# Patient Record
Sex: MALE | Race: WHITE | NOT HISPANIC OR LATINO | URBAN - METROPOLITAN AREA
[De-identification: names, ages, dates, MRNs, and addresses within clinical notes are randomized per-mention and may not be internally consistent; named-entity substitution may affect disease eponyms.]

---

## 2022-02-04 ENCOUNTER — OFFICE VISIT (OUTPATIENT)
Dept: INTERNAL MEDICINE | Facility: IMAGING CENTER | Age: 47
End: 2022-02-04
Payer: COMMERCIAL

## 2022-02-04 DIAGNOSIS — E29.1 ANDROGEN DEFICIENCY: ICD-10-CM

## 2022-02-04 DIAGNOSIS — K52.9 CHRONIC DIARRHEA: ICD-10-CM

## 2022-02-04 DIAGNOSIS — F40.243 ANXIETY WITH FLYING: ICD-10-CM

## 2022-02-04 DIAGNOSIS — E78.1 HYPERTRIGLYCERIDEMIA: ICD-10-CM

## 2022-02-04 DIAGNOSIS — Z85.828 HISTORY OF SQUAMOUS CELL CARCINOMA OF SKIN: ICD-10-CM

## 2022-02-04 DIAGNOSIS — L21.9 SEBORRHEIC DERMATITIS OF SCALP: ICD-10-CM

## 2022-02-04 DIAGNOSIS — R82.998 URICOSURIA: ICD-10-CM

## 2022-02-04 PROBLEM — I48.0 PAROXYSMAL A-FIB (HCC): Status: ACTIVE | Noted: 2018-03-19

## 2022-02-04 PROBLEM — I48.91 ATRIAL FIBRILLATION (HCC): Status: ACTIVE | Noted: 2018-03-19

## 2022-02-04 PROBLEM — M85.859 OSTEOPENIA OF HIP: Status: ACTIVE | Noted: 2022-02-04

## 2022-02-04 PROCEDURE — 99203 OFFICE O/P NEW LOW 30 MIN: CPT | Performed by: INTERNAL MEDICINE

## 2022-02-04 RX ORDER — ALPRAZOLAM 0.5 MG/1
0.5 TABLET ORAL PRN
COMMUNITY

## 2022-02-04 RX ORDER — CHOLESTYRAMINE 4 G/9G
1 POWDER, FOR SUSPENSION ORAL DAILY
COMMUNITY
Start: 2022-02-02

## 2022-02-04 RX ORDER — CHOLECALCIFEROL (VITAMIN D3) 125 MCG
2000 CAPSULE ORAL DAILY
COMMUNITY

## 2022-02-04 RX ORDER — KETOCONAZOLE 20 MG/ML
SHAMPOO TOPICAL
Qty: 120 ML | Refills: 3 | Status: SHIPPED | OUTPATIENT
Start: 2022-02-04

## 2022-02-04 NOTE — PROGRESS NOTES
New Patient Note      HPI:        Jose comes in today to establish. He has been seen and treated in past at [facility] for multiple issues that include chronic diarrhea, generalized anxiety disorder, androgen deficiency, insomnia, migraine headaches, paroxysmal atrial fibrillation, and basal cell and squamous cell carcinoma. 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 He has seen an endocrinologist in [facility] in past for his androgen deficiency but states he was not given reason as to why this exists. He states androgel helps with decreased energy and decreased libido. He states he had executive physical one year ago and was told he has osteopenia of let hip. He has been on lexapro in past for JAKOB when he was in a bad relationship and [place] large city; since living here he has done well and not taking medication for JAKOB on any regular basis. He states he will use ketoconazole shampoo for dry scalp at times which has helped.    Past Medical History:   Diagnosis Date   • Androgen deficiency 2/4/2022   • Atrial fibrillation, paroxysmal 3/19/2018    Formatting of this note might be different from the original. 4/1999, 10/1999, 11/21/2000, [facility]admit 4-16-12   • Chronic diarrhea 3/19/2018    Formatting of this note might be different from the original. after cholecystectomy   • History of squamous cell carcinoma of skin 2/4/2022    2017: Right forehead required Moh's       History reviewed. No pertinent family history.    Social History     Tobacco Use   • Smoking status: Never Smoker   • Smokeless tobacco: Never Used   Substance Use Topics   • Alcohol use: Yes     Comment: Very occasional   • Drug use: Never       Past Surgical History:   Procedure Laterality Date   • CHOLECYSTECTOMY  2011   • UMBILICAL HERNIA REPAIR  2011        Current Outpatient Medications   Medication Sig Dispense Refill   • cholestyramine (QUESTRAN)  "4 g packet Take 1 Packet by mouth every day. MUST PRESCRIBE \"PAR PHARMACEUTICAL\" BRAND     • ALPRAZolam (XANAX) 0.5 MG Tab Take 0.5 mg by mouth as needed (flying anxiety).     • Cholecalciferol (VITAMIN D3) 50 MCG (2000 UT) Tab Take 2,000 Units by mouth every day.     • BIOTIN MAXIMUM PO Take  by mouth every day.     • Saw Palmetto, Serenoa repens, (SAW PALMETTO PO) Take  by mouth every day.       No current facility-administered medications for this visit.       Allergies   Allergen Reactions   • Food Swelling     Lip swelling with nuts   • Finasteride Unspecified     Headache   • Monosodium Glutamate Unspecified     Headache       ROS    Physical Exam  URINALYSIS W/RELEX TO CULTURE  Order: [id number]   Ref Range & Units 3 wk ago   Color, UA  LIGHT YELLOW    Clarity, UA  TURBID    Specific Gravity, UA 1.002 - 1.024 1.032 High     pH, UA 5.0 - 7.7 5.5    Protein, UA NEG NEG    Glucose, UA NEG NEG    Ketones, UA NEG NEG    Bilirubin, UA NEG NEG    Blood, UA NEG NEG    Leukocyte Esterase, UA NEG Carissa/uL NEG    Nitrite, UA NEG NEG    Urobilinogen NEG NEG    RBC, UA <4 /HPF 1    WBC, UA <3 /HPF <1    Bacteria, UA NONE SEEN /HPF NONE SEEN    NOTE:  Reflex testing for culture not indicated.    Mucus, UA RARE /HPF 3+ Abnormal     Uric Acid Crystals, UA NEG /HPF 4+ Abnormal     Resulting Agency  [facility] DEPT OF PATHOLOGY & LAB MEDICINE     CBC WITH DIFFERENTIAL  Order: [id number]   Ref Range & Units 3 wk ago   WBC 4.1 - 11.4 10^3/uL 6.5    RBC 4.53 - 5.95 10^6/uL 5.14    Comment: Verified by repeat analysis.   Hemoglobin 13.5 - 17.4 g/dL 16.1    Comment: Verified by repeat analysis.   Hematocrit 40.1 - 50.6 % 46.2    MCV 80.6 - 99.5 fL 89.5    MCH 26.9 - 32.3 pg 30.4    MCHC 31.9 - 35.3 g/dL 34.0    RDW 12 - 14.8 % 12.4    Platelet Count 140 - 400 10^3/uL 167    MPV 10.7 - 17.9 fL 11.2    Polys 34.3 - 62.9 % 59.0    Polys, Absolute 1.4 - 7.15 10^9/L 3.81    Lymphocytes 20.5 - 51.1 % 30.1    Lymphocytes, Absolute " 0.9 - 2.6 10^9/L 1.94    Monocytes 3.3 - 10 % 7.9    Monocytes, Absolute 0.1 - 1.14 10^9/L 0.51    Eosinophils, Absolute 0.0 - 0.8 10^9/L 0.2    Basophils 0 - 1.0 % 0.5    Eosinophils 0 - 7 % 2.3    Basophils, Absolute 0.0 - 0.1 10^9/L 0.0    Immature Granulocytes 0.02 - 0.42 % 0.2    Immature Granulocytes, Absolute 0.00 - 0.03 10^9/L 0.01    Resulting [facility] [place] LAB   Specimen Collected: 01/10/22  9:10 AM Last Resulted: 01/10/22  3:15 PM         VITAMIN B12  Order: [id number]   Ref Range & Units 3 wk ago   Vitamin B12 180 - 914 pg/mL 543    Comment:    ALTHOUGH THE REFERENCE RANGE FOR VITAMINE B12 -914 PG/ML, IT HAS   BEEN REPORTED THAT BETWEEN 5 AND 10% OF PATIENTS WITH VALUES BETWEEN 200    PG/ML MAY EXPERIENCE NEUROPSYCHIATRIC AND HEMATOLOGIC   ABNORMALITIES DUE TO OCCULT B12 DEFICIENCY: LESS THAN 1% OF PATIENTS   WITH VALUES ABOVE 400 PG/ML WILL HAVE SYMPTOMS.   Methodology for this test is by chemiluminescense on the [employer]   Access 2.   Resulting [facility] [place] LAB   Specimen Collected: 01/10/22  9:10 AM Last Resulted: 01/10/22  3:15 PM     LIPID PANEL  Order: [id number]   Ref Range & Units 3 wk ago   Cholesterol 50 - 200 mg/dL 168    Comment: Cholesterol reference range was changed from random to fasting on 05/08/ 2019.  ALB   HDL Cholesterol >40 mg/dL 39.8 Low     Comment: HDL Cholesterol reference range was changed from random to fasting on   05/08/2019.  ALB   Triglycerides 10 - 160 mg/dL 107    LDL Calculated <130 mg/dL 107    Comment: LDL Cholesterol reference range was changed from random to fasting on   05/08/2019.  ALB   Cholesterol/HDL Ratio <5.0 Ratio 4.2    Resulting [facility] [place] LAB   Specimen Collected: 01/10/22  9:10 AM Last Resulted: 01/10/22  3:15 PM     METABOLIC PANEL, COMPREHENSIVE (CMP)  Order: [id number]   Ref Range & Units 3 wk ago   Sodium 135 - 147 mmol/L  142    Potassium, Plasma 3.6 - 5.0 mmol/L 4.0    Chloride 99 - 108 mmol/L 106    Carbon Dioxide 21 - 31 mmol/L 27    Urea Nitrogen 7 - 25 mg/dL 22    Creatinine 0.4 - 1.2 mg/dL 1.3 High     eGFR Male 60 ml/min 60    Glucose 70 - 100 mg/dL 92    Comment: Glucose reference range was changed from a random glucose to a fasting   glucose on 05/08/2019.  ALB   Calcium, Serum 8.9 - 10.3 mg/dL 9.5    ALT 1.3 - 44 U/L 27    AST 13 - 36 U/L 19    Bilirubin, Total 0.2 - 1.0 mg/dL 0.7    Total Protein 6.4 - 8.2 g/dL 7.4    Albumin 3.7 - 4.9 g/dL 5.0 High     Globulin 1.7 - 4.0 g/dL 2.4    A/G Ratio 0.9 - 2.5 Ratio 2.1    Alkaline Phosphatase 34 - 104 U/L 73    Resulting [facility] LAB   Specimen Collected: 01/10/22  9:10 AM Last Resulted: 01/10/22  3:15 PM     THYROID STIMULATING HORMONE (TSH) REFLEX ON ABNORMAL TO FREE T4  Order: [id number]   Ref Range & Units 3 wk ago   TSH 0.39 - 4.60 mIU/L 1.93    Resulting Agency  [facility] DEPT OF PATHOLOGY & LAB MEDICINE   Specimen Collected: 01/10/22  8:30 AM Last Resulted: 01/11/22  3:19 AM         Ambulatory Vitals  There were no vitals taken for this visit.    Assessment and Plan:    1. Androgen deficiency  Referral to Endocrinology    Testosterone, Free & Total, Adult Male (w/SHBG)    FSH/LH   2. Chronic diarrhea  Comp Metabolic Panel   3. Seborrheic dermatitis of scalp     4. History of squamous cell carcinoma of skin  Referral to Dermatology   5. Anxiety with flying     6. Hypertriglyceridemia  Lipid Profile   7. Uricosuria  URINALYSIS      Check CMP, lipid panel, free and total testosterone, FSH/LH, urinalysis in 6 months.    Caden Khalil M.D.

## 2022-03-09 ENCOUNTER — TELEPHONE (OUTPATIENT)
Dept: INTERNAL MEDICINE | Facility: IMAGING CENTER | Age: 47
End: 2022-03-09
Payer: COMMERCIAL

## 2022-03-10 NOTE — TELEPHONE ENCOUNTER
Patient developed cold symptoms 2 1/2 weeks ago.  He feels completely recovered, with the exception of stubborn nasal congestion and post nasal drip.  He is having frequent bloody noses.  He has been using Sinex.  He declines antibiotics at this time.  Recommend:  1.  Stop Sinex  2. Flonase BID  3. Vaseline to nares  If he fails to improve, he will schedule appointment with Dr Grayson

## 2022-03-18 ENCOUNTER — OFFICE VISIT (OUTPATIENT)
Dept: INTERNAL MEDICINE | Facility: IMAGING CENTER | Age: 47
End: 2022-03-18
Payer: COMMERCIAL

## 2022-03-18 VITALS
WEIGHT: 176 LBS | DIASTOLIC BLOOD PRESSURE: 72 MMHG | RESPIRATION RATE: 14 BRPM | TEMPERATURE: 98.2 F | HEIGHT: 70 IN | HEART RATE: 73 BPM | SYSTOLIC BLOOD PRESSURE: 108 MMHG | BODY MASS INDEX: 25.2 KG/M2 | OXYGEN SATURATION: 95 %

## 2022-03-18 DIAGNOSIS — R09.81 SINUS CONGESTION: ICD-10-CM

## 2022-03-18 PROBLEM — J45.909 ASTHMA: Status: ACTIVE | Noted: 2022-03-18

## 2022-03-18 PROCEDURE — 99213 OFFICE O/P EST LOW 20 MIN: CPT | Performed by: INTERNAL MEDICINE

## 2022-03-18 NOTE — PROGRESS NOTES
"Established Patient Note   HPI:        Jose comes in today with nasal congestion and \"plugged ears\" for past 4 weeks. No green discharge. He had been using sinex nasal spray but stopped due to our concerns regarding rebound congestion. He has been using flonase with some relief.    Past Medical History:   Diagnosis Date   • Androgen deficiency 2/4/2022   • Atrial fibrillation, paroxysmal 3/19/2018    Formatting of this note might be different from the original. 4/1999, 10/1999, 11/21/2000, [facility].admit 4-16-12   • Chronic diarrhea 3/19/2018    Formatting of this note might be different from the original. after cholecystectomy   • History of squamous cell carcinoma of skin 2/4/2022    2017: Right forehead required [facility]       Current Outpatient Medications   Medication Sig Dispense Refill   • cholestyramine (QUESTRAN) 4 g packet Take 1 Packet by mouth every day. MUST PRESCRIBE \"PAR PHARMACEUTICAL\" BRAND     • ALPRAZolam (XANAX) 0.5 MG Tab Take 0.5 mg by mouth as needed (flying anxiety).     • Cholecalciferol (VITAMIN D3) 50 MCG (2000 UT) Tab Take 2,000 Units by mouth every day.     • BIOTIN MAXIMUM PO Take  by mouth every day.     • Saw Palmetto, Serenoa repens, (SAW PALMETTO PO) Take  by mouth every day.     • ketoconazole (NIZORAL) 2 % shampoo Shampoo to wet hair every 72 hours, and as needed. 120 mL 3     No current facility-administered medications for this visit.         Allergies   Allergen Reactions   • Food Swelling     Lip swelling with nuts   • Finasteride Unspecified     Headache   • Monosodium Glutamate Unspecified     Headache         Social History     Tobacco Use   • Smoking status: Never Smoker   • Smokeless tobacco: Never Used   Substance Use Topics   • Alcohol use: Yes     Comment: Very occasional   • Drug use: Never       Past Surgical History:   Procedure Laterality Date   • CHOLECYSTECTOMY  2011   • UMBILICAL HERNIA REPAIR  2011        ROS    Ambulatory Vitals  /72 (BP Location: " "Left arm, Patient Position: Sitting, BP Cuff Size: Adult)   Pulse 73   Temp 36.8 °C (98.2 °F) (Temporal)   Resp 14   Ht 1.778 m (5' 10\")   Wt 79.8 kg (176 lb)   SpO2 95%   BMI 25.25 kg/m²     Physical Exam  Constitutional:       Appearance: Normal appearance.   HENT:      Head:      Comments: Sinuses nontender over maxillary or frontal bilaterally.     Right Ear: Tympanic membrane normal.      Left Ear: Tympanic membrane normal.      Mouth/Throat:      Mouth: Mucous membranes are dry.      Pharynx: Oropharynx is clear. No oropharyngeal exudate.       Assessment and Plan:     1. Sinus congestion       He will restart claritin-D for congestion and add mucinex to help thin mucus. Continue flonase nasal. Discussed if symptoms continue to consist will order CT sinuses; he agrees.    Caden Khalil M.D.  "

## 2022-03-29 ENCOUNTER — TELEPHONE (OUTPATIENT)
Dept: INTERNAL MEDICINE | Facility: IMAGING CENTER | Age: 47
End: 2022-03-29
Payer: COMMERCIAL

## 2022-03-29 RX ORDER — CEFDINIR 300 MG/1
300 CAPSULE ORAL 2 TIMES DAILY
Qty: 20 CAPSULE | Refills: 0 | Status: SHIPPED | OUTPATIENT
Start: 2022-03-29 | End: 2022-04-08

## 2022-03-29 NOTE — TELEPHONE ENCOUNTER
Jose has been having problems with sinus congestion and plugged ears on & off for 3 weeks.  He has tried Claritin D, Mucinex, and Flonase.  His symptoms increased markedly in the last 24 hours.  He agrees to try antibiotic therapy.  Prescription to preferred pharmacy per Dr Grayson.  Please take with probiotic.

## 2022-06-03 ENCOUNTER — TELEPHONE (OUTPATIENT)
Dept: INTERNAL MEDICINE | Facility: IMAGING CENTER | Age: 47
End: 2022-06-03
Payer: COMMERCIAL

## 2022-06-03 RX ORDER — AZITHROMYCIN 250 MG/1
TABLET, FILM COATED ORAL
Qty: 6 TABLET | Refills: 1 | Status: SHIPPED | OUTPATIENT
Start: 2022-06-03 | End: 2022-11-04

## 2022-07-04 DIAGNOSIS — U07.1 COVID: ICD-10-CM

## 2022-11-04 ENCOUNTER — HOSPITAL ENCOUNTER (OUTPATIENT)
Facility: MEDICAL CENTER | Age: 47
End: 2022-11-04
Attending: INTERNAL MEDICINE
Payer: COMMERCIAL

## 2022-11-04 ENCOUNTER — OFFICE VISIT (OUTPATIENT)
Dept: INTERNAL MEDICINE | Facility: IMAGING CENTER | Age: 47
End: 2022-11-04
Payer: COMMERCIAL

## 2022-11-04 VITALS
TEMPERATURE: 97.6 F | HEART RATE: 70 BPM | DIASTOLIC BLOOD PRESSURE: 74 MMHG | SYSTOLIC BLOOD PRESSURE: 112 MMHG | BODY MASS INDEX: 25.2 KG/M2 | OXYGEN SATURATION: 97 % | HEIGHT: 70 IN | RESPIRATION RATE: 14 BRPM | WEIGHT: 176 LBS

## 2022-11-04 DIAGNOSIS — E78.1 HYPERTRIGLYCERIDEMIA: ICD-10-CM

## 2022-11-04 DIAGNOSIS — Z86.16 PERSONAL HISTORY OF COVID-19: ICD-10-CM

## 2022-11-04 DIAGNOSIS — Z00.00 WELLNESS EXAMINATION: ICD-10-CM

## 2022-11-04 DIAGNOSIS — R82.998 URICOSURIA: ICD-10-CM

## 2022-11-04 DIAGNOSIS — K52.9 CHRONIC DIARRHEA: ICD-10-CM

## 2022-11-04 DIAGNOSIS — R19.8 CHANGE IN BOWEL FUNCTION: ICD-10-CM

## 2022-11-04 DIAGNOSIS — F41.1 GENERALIZED ANXIETY DISORDER: ICD-10-CM

## 2022-11-04 DIAGNOSIS — E29.1 ANDROGEN DEFICIENCY: ICD-10-CM

## 2022-11-04 PROCEDURE — 85025 COMPLETE CBC W/AUTO DIFF WBC: CPT

## 2022-11-04 PROCEDURE — 83002 ASSAY OF GONADOTROPIN (LH): CPT

## 2022-11-04 PROCEDURE — 86769 SARS-COV-2 COVID-19 ANTIBODY: CPT

## 2022-11-04 PROCEDURE — 84270 ASSAY OF SEX HORMONE GLOBUL: CPT

## 2022-11-04 PROCEDURE — 82378 CARCINOEMBRYONIC ANTIGEN: CPT

## 2022-11-04 PROCEDURE — 84425 ASSAY OF VITAMIN B-1: CPT

## 2022-11-04 PROCEDURE — 84403 ASSAY OF TOTAL TESTOSTERONE: CPT

## 2022-11-04 PROCEDURE — 83001 ASSAY OF GONADOTROPIN (FSH): CPT

## 2022-11-04 PROCEDURE — 84402 ASSAY OF FREE TESTOSTERONE: CPT

## 2022-11-04 PROCEDURE — 81001 URINALYSIS AUTO W/SCOPE: CPT

## 2022-11-04 PROCEDURE — 82306 VITAMIN D 25 HYDROXY: CPT

## 2022-11-04 PROCEDURE — 84207 ASSAY OF VITAMIN B-6: CPT

## 2022-11-04 PROCEDURE — 80053 COMPREHEN METABOLIC PANEL: CPT

## 2022-11-04 PROCEDURE — 84443 ASSAY THYROID STIM HORMONE: CPT

## 2022-11-04 PROCEDURE — 82607 VITAMIN B-12: CPT

## 2022-11-04 PROCEDURE — 80061 LIPID PANEL: CPT

## 2022-11-04 PROCEDURE — 99214 OFFICE O/P EST MOD 30 MIN: CPT | Performed by: INTERNAL MEDICINE

## 2022-11-04 RX ORDER — ESCITALOPRAM OXALATE 5 MG/1
5 TABLET ORAL DAILY
COMMUNITY
Start: 2022-10-03

## 2022-11-04 NOTE — PROGRESS NOTES
"Established Patient Note   HPI:        Jose comes in today requesting check up. He travels a great deal. He states he has been having more anxiety. He will occasionally have panic attacks. He wishes to have blood work done today and requests a CEA.    Past Medical History:   Diagnosis Date    Androgen deficiency 2/4/2022    Atrial fibrillation, paroxysmal 3/19/2018    Formatting of this note might be different from the original. 4/1999, 10/1999, 11/21/2000, [facility].admit 4-16-12    Chronic diarrhea 3/19/2018    Formatting of this note might be different from the original. after cholecystectomy    Generalized anxiety disorder 11/4/2022    History of squamous cell carcinoma of skin 2/4/2022    2017: Right forehead required [facility]       Current Outpatient Medications   Medication Sig Dispense Refill    escitalopram (LEXAPRO) 5 MG tablet Take 5 mg by mouth every day.      cholestyramine (QUESTRAN) 4 g packet Take 1 Packet by mouth every day. MUST PRESCRIBE \"PAR PHARMACEUTICAL\" BRAND      ALPRAZolam (XANAX) 0.5 MG Tab Take 0.5 mg by mouth as needed (flying anxiety).      Saw Palmetto, Serenoa repens, (SAW PALMETTO PO) Take  by mouth every day.      ketoconazole (NIZORAL) 2 % shampoo Shampoo to wet hair every 72 hours, and as needed. 120 mL 3    Cholecalciferol (VITAMIN D3) 50 MCG (2000 UT) Tab Take 2,000 Units by mouth every day. (Patient not taking: Reported on 11/4/2022)      BIOTIN MAXIMUM PO Take  by mouth every day. (Patient not taking: Reported on 11/4/2022)       No current facility-administered medications for this visit.         Allergies   Allergen Reactions    Food Swelling     Lip swelling with nuts    Finasteride Unspecified     Headache    Monosodium Glutamate Unspecified     Headache         Social History     Tobacco Use    Smoking status: Never    Smokeless tobacco: Never   Substance Use Topics    Alcohol use: Yes     Comment: Very occasional    Drug use: Never       Past Surgical History: " "  Procedure Laterality Date    CHOLECYSTECTOMY  2011    UMBILICAL HERNIA REPAIR  2011        Review of Systems   Gastrointestinal:         He states he alternating diarrhea and constipation that has been present for years but seems to be worse lately.     Ambulatory Vitals  /74 (BP Location: Left arm, Patient Position: Sitting, BP Cuff Size: Adult)   Pulse 70   Temp 36.4 °C (97.6 °F) (Temporal)   Resp 14   Ht 1.778 m (5' 10\")   Wt 79.8 kg (176 lb)   SpO2 97%   BMI 25.25 kg/m²     Physical Exam  Constitutional:       Appearance: Normal appearance.   Cardiovascular:      Rate and Rhythm: Normal rate and regular rhythm.      Heart sounds: Normal heart sounds. No murmur heard.    No friction rub. No gallop.   Pulmonary:      Effort: Pulmonary effort is normal.      Breath sounds: Normal breath sounds. No wheezing or rales.       Assessment and Plan:     1. Generalized anxiety disorder  TSH      2. Change in bowel function  CBC WITH DIFFERENTIAL    CEA    Referral to Gastroenterology      3. Wellness examination  VITAMIN B12    CBC WITH DIFFERENTIAL    VITAMIN B6    VITAMIN B1    VITAMIN D,25 HYDROXY (DEFICIENCY)    TSH    CEA        He admits he has not been taking his lexapro daily. Recommend he take the lexapro daily in attempt to help his JAKOB.   Face to face time: 30 minutes with greater than 50% of time spent with direct patient contact and medical management.      Caden Khalil M.D.  "

## 2022-11-05 LAB
25(OH)D3 SERPL-MCNC: 22 NG/ML (ref 30–100)
ALBUMIN SERPL BCP-MCNC: 4.6 G/DL (ref 3.2–4.9)
ALBUMIN/GLOB SERPL: 1.6 G/DL
ALP SERPL-CCNC: 78 U/L (ref 30–99)
ALT SERPL-CCNC: 34 U/L (ref 2–50)
AMORPH CRY #/AREA URNS HPF: PRESENT /HPF
ANION GAP SERPL CALC-SCNC: 11 MMOL/L (ref 7–16)
APPEARANCE UR: ABNORMAL
AST SERPL-CCNC: 24 U/L (ref 12–45)
BACTERIA #/AREA URNS HPF: NEGATIVE /HPF
BASOPHILS # BLD AUTO: 0.4 % (ref 0–1.8)
BASOPHILS # BLD: 0.03 K/UL (ref 0–0.12)
BILIRUB SERPL-MCNC: 0.4 MG/DL (ref 0.1–1.5)
BILIRUB UR QL STRIP.AUTO: NEGATIVE
BUN SERPL-MCNC: 21 MG/DL (ref 8–22)
CALCIUM SERPL-MCNC: 9.4 MG/DL (ref 8.5–10.5)
CEA SERPL-MCNC: 1.5 NG/ML (ref 0–3)
CHLORIDE SERPL-SCNC: 103 MMOL/L (ref 96–112)
CHOLEST SERPL-MCNC: 179 MG/DL (ref 100–199)
CO2 SERPL-SCNC: 27 MMOL/L (ref 20–33)
COLOR UR: YELLOW
CREAT SERPL-MCNC: 1 MG/DL (ref 0.5–1.4)
EOSINOPHIL # BLD AUTO: 0.1 K/UL (ref 0–0.51)
EOSINOPHIL NFR BLD: 1.5 % (ref 0–6.9)
EPI CELLS #/AREA URNS HPF: NEGATIVE /HPF
ERYTHROCYTE [DISTWIDTH] IN BLOOD BY AUTOMATED COUNT: 39.1 FL (ref 35.9–50)
FSH SERPL-ACNC: 1.9 MIU/ML (ref 1.5–12.4)
GFR SERPLBLD CREATININE-BSD FMLA CKD-EPI: 94 ML/MIN/1.73 M 2
GLOBULIN SER CALC-MCNC: 2.9 G/DL (ref 1.9–3.5)
GLUCOSE SERPL-MCNC: 80 MG/DL (ref 65–99)
GLUCOSE UR STRIP.AUTO-MCNC: NEGATIVE MG/DL
HCT VFR BLD AUTO: 46 % (ref 42–52)
HDLC SERPL-MCNC: 34 MG/DL
HGB BLD-MCNC: 15.7 G/DL (ref 14–18)
HYALINE CASTS #/AREA URNS LPF: ABNORMAL /LPF
IMM GRANULOCYTES # BLD AUTO: 0.01 K/UL (ref 0–0.11)
IMM GRANULOCYTES NFR BLD AUTO: 0.1 % (ref 0–0.9)
KETONES UR STRIP.AUTO-MCNC: ABNORMAL MG/DL
LDLC SERPL CALC-MCNC: 100 MG/DL
LEUKOCYTE ESTERASE UR QL STRIP.AUTO: NEGATIVE
LH SERPL-ACNC: 4.1 IU/L (ref 1.7–8.6)
LYMPHOCYTES # BLD AUTO: 2.03 K/UL (ref 1–4.8)
LYMPHOCYTES NFR BLD: 30.4 % (ref 22–41)
MCH RBC QN AUTO: 30 PG (ref 27–33)
MCHC RBC AUTO-ENTMCNC: 34.1 G/DL (ref 33.7–35.3)
MCV RBC AUTO: 88 FL (ref 81.4–97.8)
MICRO URNS: ABNORMAL
MONOCYTES # BLD AUTO: 0.46 K/UL (ref 0–0.85)
MONOCYTES NFR BLD AUTO: 6.9 % (ref 0–13.4)
NEUTROPHILS # BLD AUTO: 4.04 K/UL (ref 1.82–7.42)
NEUTROPHILS NFR BLD: 60.7 % (ref 44–72)
NITRITE UR QL STRIP.AUTO: NEGATIVE
NRBC # BLD AUTO: 0 K/UL
NRBC BLD-RTO: 0 /100 WBC
PH UR STRIP.AUTO: 5 [PH] (ref 5–8)
PLATELET # BLD AUTO: 156 K/UL (ref 164–446)
PMV BLD AUTO: 10.8 FL (ref 9–12.9)
POTASSIUM SERPL-SCNC: 4 MMOL/L (ref 3.6–5.5)
PROT SERPL-MCNC: 7.5 G/DL (ref 6–8.2)
PROT UR QL STRIP: NEGATIVE MG/DL
RBC # BLD AUTO: 5.23 M/UL (ref 4.7–6.1)
RBC # URNS HPF: ABNORMAL /HPF
RBC UR QL AUTO: NEGATIVE
SODIUM SERPL-SCNC: 141 MMOL/L (ref 135–145)
SP GR UR STRIP.AUTO: 1.03
TRIGL SERPL-MCNC: 223 MG/DL (ref 0–149)
TSH SERPL DL<=0.005 MIU/L-ACNC: 2.32 UIU/ML (ref 0.38–5.33)
UROBILINOGEN UR STRIP.AUTO-MCNC: 0.2 MG/DL
VIT B12 SERPL-MCNC: 659 PG/ML (ref 211–911)
WBC # BLD AUTO: 6.7 K/UL (ref 4.8–10.8)
WBC #/AREA URNS HPF: ABNORMAL /HPF

## 2022-11-07 LAB
SHBG SERPL-SCNC: 21 NMOL/L (ref 17–56)
TESTOST FREE MFR SERPL: 2.3 % (ref 1.6–2.9)
TESTOST FREE SERPL-MCNC: 90 PG/ML (ref 47–244)
TESTOST SERPL-MCNC: 394 NG/DL (ref 300–890)

## 2022-11-10 LAB
SARS-COV-2 IGG SERPLBLD QL IA.RAPID: POSITIVE
VIT B1 BLD-MCNC: 128 NMOL/L (ref 70–180)

## 2023-02-17 ENCOUNTER — OFFICE VISIT (OUTPATIENT)
Dept: INTERNAL MEDICINE | Facility: IMAGING CENTER | Age: 48
End: 2023-02-17
Payer: COMMERCIAL

## 2023-02-17 VITALS
WEIGHT: 186 LBS | HEART RATE: 69 BPM | SYSTOLIC BLOOD PRESSURE: 108 MMHG | BODY MASS INDEX: 26.63 KG/M2 | DIASTOLIC BLOOD PRESSURE: 66 MMHG | OXYGEN SATURATION: 96 % | TEMPERATURE: 97.9 F | HEIGHT: 70 IN | RESPIRATION RATE: 14 BRPM

## 2023-02-17 DIAGNOSIS — F41.0 GENERALIZED ANXIETY DISORDER WITH PANIC ATTACKS: ICD-10-CM

## 2023-02-17 DIAGNOSIS — F41.1 GENERALIZED ANXIETY DISORDER WITH PANIC ATTACKS: ICD-10-CM

## 2023-02-17 DIAGNOSIS — F40.243 ANXIETY WITH FLYING: ICD-10-CM

## 2023-02-17 DIAGNOSIS — K52.9 CHRONIC DIARRHEA: ICD-10-CM

## 2023-02-17 DIAGNOSIS — Z12.11 SCREENING FOR MALIGNANT NEOPLASM OF COLON: ICD-10-CM

## 2023-02-17 DIAGNOSIS — Z85.828 HISTORY OF SKIN CANCER: ICD-10-CM

## 2023-02-17 DIAGNOSIS — I48.91 ATRIAL FIBRILLATION, UNSPECIFIED TYPE (HCC): ICD-10-CM

## 2023-02-17 PROCEDURE — 99215 OFFICE O/P EST HI 40 MIN: CPT | Performed by: INTERNAL MEDICINE

## 2023-02-17 ASSESSMENT — FIBROSIS 4 INDEX: FIB4 SCORE: 1.21

## 2023-02-17 NOTE — PROGRESS NOTES
"Chief Complaint   Patient presents with    [facility]       HISTORY OF THE PRESENT ILLNESS: Patient is a 47 y.o. male.     Patient comes in to [facility].  He is in good health. Is a history of anxiety including panic attacks.  Has been moderately well controlled with low-dose Lexapro and Xanax which he uses primarily for when he flies.  He also has a single episode of atrial fibrillation.  There has been no recurrence.  He does not get regular exercise.  His diet is poor.  He does not drink alcohol or use tobacco products.  He is due for colon cancer screening.    He also has a history of chronic diarrhea.  This appears to be due to bile acid issues postcholecystectomy.  He has done well on cholestyramine.  He has noted that if he takes the medication in excess then he can develop constipation.  He has an ongoing issues regarding constipation and diarrhea.  He has never had a colonoscopy.  No melena or rectal bleeding.  No family history of colon cancer or other issues that would elevate his risk.    We reviewed his laboratory from November 2022.  He had mild thrombocytopenia.  Is unclear if this is new or ongoing.  His WBC, hemoglobin/hematocrit and differential were normal.  His comprehensive metabolic was normal.  His cholesterol is good at 179 with an LDL cholesterol of 100 however his triglycerides are high at 223 and HDL is low at 34.    Allergies: Food, Finasteride, and Monosodium glutamate    Current Outpatient Medications Ordered in Epic   Medication Sig Dispense Refill    escitalopram (LEXAPRO) 5 MG tablet Take 5 mg by mouth every day.      cholestyramine (QUESTRAN) 4 g packet Take 1 Packet by mouth every day. MUST PRESCRIBE \"PAR PHARMACEUTICAL\" BRAND      ALPRAZolam (XANAX) 0.5 MG Tab Take 0.5 mg by mouth as needed (flying anxiety).      Cholecalciferol (VITAMIN D3) 50 MCG (2000 UT) Tab Take 2,000 Units by mouth every day.      BIOTIN MAXIMUM PO Take  by mouth every day.      ketoconazole " "(NIZORAL) 2 % shampoo Shampoo to wet hair every 72 hours, and as needed. 120 mL 3    Saw Palmetto, Serenoa repens, (SAW PALMETTO PO) Take  by mouth every day. (Patient not taking: Reported on 2/17/2023)       No current [facility]-ordered facility-administered medications on file.       Past medical history, social history and family history were reviewed from chart today    Review of systems: Per HPI.    All others negative.     Exam: /66 (BP Location: Left arm, Patient Position: Sitting, BP Cuff Size: Adult)   Pulse 69   Temp 36.6 °C (97.9 °F) (Temporal)   Resp 14   Ht 1.778 m (5' 10\")   Wt 84.4 kg (186 lb)   SpO2 96%       General: Well-nourished, well-developed. No change in appearance. No distress.  HEENT: Normocephalic.  Pulmonary: Clear.. Normal effort.  Cardiovascular: Regular   Abdomen: Normal appearing. Soft, nontender, nondistended.   Neurologic: Cranial nerves II through XII are grossly intact, alert and oriented x3        Diagnosis:  1. Chronic diarrhea        2. Generalized anxiety disorder with panic attacks        3. Anxiety with flying        4. Atrial fibrillation, unspecified type (HCC)        5. Screening for malignant neoplasm of colon  COLOGUARD (FIT DNA)      6. History of skin cancer  Referral to Dermatology          47-year-old male who is in good health.  He would benefit from improved diet and regular exercise.  MRI is slightly above goal.  He would benefit from mild weight loss.  I recommended colon cancer screening.  We discussed options including colonoscopy versus Cologuard.  The benefits of each were discussed.  We agreed upon a Cologuard.  He does not appear to be at increased risk of colon cancer.    He has a history of anxiety.  We had a long discussion regarding different medications that he can use to treat daily and as needed.  We agreed upon continuing with his current medications.    He has a history of migraines that can be triggered by regular use of Xanax.  He has not " experienced significant symptoms in years.  He gets mild symptoms which she is able to treat with anti-inflammatories.    He has elevated triglycerides which would benefit from regular exercise and diet.  His HDL would also improve with regular exercise.  His LDL is appropriate.  He thinks this may be due to cholestyramine.  I do not see a previous laboratory value for comparison.    History of skin cancer and is interested in establishing with local dermatologist.  I recommended Dr. Renetta Loya.    My total time spent caring for the patient on the day of the encounter was  greater than 45 minutes.   This includes obtaining history, reviewing chart, physical exam, patient education, reviewing outside records, placing orders, interpreting tests and coordinating care.

## 2023-03-02 ENCOUNTER — TELEPHONE (OUTPATIENT)
Dept: INTERNAL MEDICINE | Facility: IMAGING CENTER | Age: 48
End: 2023-03-02
Payer: COMMERCIAL

## 2023-03-03 NOTE — TELEPHONE ENCOUNTER
Informed awaiting call from Dr Tomasa Loya for ok to schedule with her.  Will contact him when approved.

## 2023-03-16 ENCOUNTER — NON-PROVIDER VISIT (OUTPATIENT)
Dept: INTERNAL MEDICINE | Facility: IMAGING CENTER | Age: 48
End: 2023-03-16
Payer: COMMERCIAL

## 2023-03-16 ENCOUNTER — HOSPITAL ENCOUNTER (OUTPATIENT)
Facility: MEDICAL CENTER | Age: 48
End: 2023-03-16
Attending: INTERNAL MEDICINE
Payer: COMMERCIAL

## 2023-03-16 DIAGNOSIS — M79.10 MYALGIA: ICD-10-CM

## 2023-03-16 DIAGNOSIS — E29.1 ANDROGEN DEFICIENCY: ICD-10-CM

## 2023-03-16 DIAGNOSIS — E55.9 VITAMIN D DEFICIENCY: ICD-10-CM

## 2023-03-16 DIAGNOSIS — E53.8 VITAMIN B 12 DEFICIENCY: ICD-10-CM

## 2023-03-16 DIAGNOSIS — R21 RASH: ICD-10-CM

## 2023-03-16 DIAGNOSIS — R53.83 OTHER FATIGUE: ICD-10-CM

## 2023-03-16 DIAGNOSIS — Z11.59 ENCOUNTER FOR HEPATITIS C SCREENING TEST FOR LOW RISK PATIENT: ICD-10-CM

## 2023-03-16 DIAGNOSIS — E78.1 HYPERTRIGLYCERIDEMIA: ICD-10-CM

## 2023-03-16 LAB
25(OH)D3 SERPL-MCNC: 30 NG/ML (ref 30–100)
ALBUMIN SERPL BCP-MCNC: 4.6 G/DL (ref 3.2–4.9)
ALBUMIN/GLOB SERPL: 1.8 G/DL
ALP SERPL-CCNC: 76 U/L (ref 30–99)
ALT SERPL-CCNC: 49 U/L (ref 2–50)
ANION GAP SERPL CALC-SCNC: 11 MMOL/L (ref 7–16)
AST SERPL-CCNC: 32 U/L (ref 12–45)
BASOPHILS # BLD AUTO: 0.3 % (ref 0–1.8)
BASOPHILS # BLD: 0.02 K/UL (ref 0–0.12)
BILIRUB SERPL-MCNC: 0.7 MG/DL (ref 0.1–1.5)
BUN SERPL-MCNC: 25 MG/DL (ref 8–22)
CALCIUM ALBUM COR SERPL-MCNC: 8.9 MG/DL (ref 8.5–10.5)
CALCIUM SERPL-MCNC: 9.4 MG/DL (ref 8.5–10.5)
CHLORIDE SERPL-SCNC: 107 MMOL/L (ref 96–112)
CHOLEST SERPL-MCNC: 176 MG/DL (ref 100–199)
CK SERPL-CCNC: 176 U/L (ref 0–154)
CO2 SERPL-SCNC: 25 MMOL/L (ref 20–33)
CREAT SERPL-MCNC: 1.18 MG/DL (ref 0.5–1.4)
EOSINOPHIL # BLD AUTO: 0.12 K/UL (ref 0–0.51)
EOSINOPHIL NFR BLD: 1.7 % (ref 0–6.9)
ERYTHROCYTE [DISTWIDTH] IN BLOOD BY AUTOMATED COUNT: 40.9 FL (ref 35.9–50)
GFR SERPLBLD CREATININE-BSD FMLA CKD-EPI: 77 ML/MIN/1.73 M 2
GLOBULIN SER CALC-MCNC: 2.5 G/DL (ref 1.9–3.5)
GLUCOSE SERPL-MCNC: 91 MG/DL (ref 65–99)
HCT VFR BLD AUTO: 44.8 % (ref 42–52)
HCV AB SER QL: NORMAL
HDLC SERPL-MCNC: 34 MG/DL
HGB BLD-MCNC: 15.3 G/DL (ref 14–18)
IMM GRANULOCYTES # BLD AUTO: 0.02 K/UL (ref 0–0.11)
IMM GRANULOCYTES NFR BLD AUTO: 0.3 % (ref 0–0.9)
LDLC SERPL CALC-MCNC: 115 MG/DL
LYMPHOCYTES # BLD AUTO: 1.88 K/UL (ref 1–4.8)
LYMPHOCYTES NFR BLD: 27.3 % (ref 22–41)
MCH RBC QN AUTO: 30.3 PG (ref 27–33)
MCHC RBC AUTO-ENTMCNC: 34.2 G/DL (ref 33.7–35.3)
MCV RBC AUTO: 88.7 FL (ref 81.4–97.8)
MONOCYTES # BLD AUTO: 0.58 K/UL (ref 0–0.85)
MONOCYTES NFR BLD AUTO: 8.4 % (ref 0–13.4)
NEUTROPHILS # BLD AUTO: 4.26 K/UL (ref 1.82–7.42)
NEUTROPHILS NFR BLD: 62 % (ref 44–72)
NRBC # BLD AUTO: 0 K/UL
NRBC BLD-RTO: 0 /100 WBC
PLATELET # BLD AUTO: 166 K/UL (ref 164–446)
PMV BLD AUTO: 10.9 FL (ref 9–12.9)
POTASSIUM SERPL-SCNC: 4 MMOL/L (ref 3.6–5.5)
PROT SERPL-MCNC: 7.1 G/DL (ref 6–8.2)
RBC # BLD AUTO: 5.05 M/UL (ref 4.7–6.1)
SODIUM SERPL-SCNC: 143 MMOL/L (ref 135–145)
TRIGL SERPL-MCNC: 136 MG/DL (ref 0–149)
TSH SERPL DL<=0.005 MIU/L-ACNC: 2.09 UIU/ML (ref 0.38–5.33)
VIT B12 SERPL-MCNC: 849 PG/ML (ref 211–911)
WBC # BLD AUTO: 6.9 K/UL (ref 4.8–10.8)

## 2023-03-16 PROCEDURE — 82607 VITAMIN B-12: CPT

## 2023-03-16 PROCEDURE — 80053 COMPREHEN METABOLIC PANEL: CPT

## 2023-03-16 PROCEDURE — 84270 ASSAY OF SEX HORMONE GLOBUL: CPT

## 2023-03-16 PROCEDURE — 84403 ASSAY OF TOTAL TESTOSTERONE: CPT

## 2023-03-16 PROCEDURE — 82550 ASSAY OF CK (CPK): CPT

## 2023-03-16 PROCEDURE — 80061 LIPID PANEL: CPT

## 2023-03-16 PROCEDURE — 86803 HEPATITIS C AB TEST: CPT

## 2023-03-16 PROCEDURE — 84443 ASSAY THYROID STIM HORMONE: CPT

## 2023-03-16 PROCEDURE — 82306 VITAMIN D 25 HYDROXY: CPT

## 2023-03-16 PROCEDURE — 85025 COMPLETE CBC W/AUTO DIFF WBC: CPT

## 2023-03-16 PROCEDURE — 86694 HERPES SIMPLEX NES ANTBDY: CPT

## 2023-03-16 PROCEDURE — 84402 ASSAY OF FREE TESTOSTERONE: CPT

## 2023-03-16 NOTE — PROGRESS NOTES
Jose Kahn is a 47 y.o. male here for a non-provider visit for lab draw    If abnormal was an in office provider notified today (if so, indicate provider)? No    Routed to PCP? No

## 2023-03-18 LAB
HSV1+2 IGG SER IA-ACNC: 0.78 IV
SHBG SERPL-SCNC: 23 NMOL/L (ref 17–56)
TESTOST FREE MFR SERPL: 2.2 % (ref 1.6–2.9)
TESTOST FREE SERPL-MCNC: 87 PG/ML (ref 47–244)
TESTOST SERPL-MCNC: 397 NG/DL (ref 300–890)

## 2024-06-24 RX ORDER — CHLORHEXIDINE GLUCONATE ORAL RINSE 1.2 MG/ML
15 SOLUTION DENTAL 2 TIMES DAILY
Qty: 1893 ML | Refills: 5 | Status: SHIPPED | OUTPATIENT
Start: 2024-06-24

## 2024-06-24 RX ORDER — CHLORHEXIDINE GLUCONATE ORAL RINSE 1.2 MG/ML
15 SOLUTION DENTAL 2 TIMES DAILY
Qty: 473 ML | Refills: 1 | Status: SHIPPED | OUTPATIENT
Start: 2024-06-24

## 2024-08-09 ENCOUNTER — TELEPHONE (OUTPATIENT)
Dept: INTERNAL MEDICINE | Facility: IMAGING CENTER | Age: 49
End: 2024-08-09
Payer: COMMERCIAL

## 2024-08-09 RX ORDER — AZITHROMYCIN 250 MG/1
TABLET, FILM COATED ORAL
Qty: 6 TABLET | Refills: 0 | Status: SHIPPED | OUTPATIENT
Start: 2024-08-09

## 2025-04-14 ENCOUNTER — OFFICE VISIT (OUTPATIENT)
Dept: INTERNAL MEDICINE | Facility: IMAGING CENTER | Age: 50
End: 2025-04-14
Payer: COMMERCIAL

## 2025-04-14 ENCOUNTER — HOSPITAL ENCOUNTER (OUTPATIENT)
Facility: MEDICAL CENTER | Age: 50
End: 2025-04-14
Attending: FAMILY MEDICINE
Payer: COMMERCIAL

## 2025-04-14 ENCOUNTER — NON-PROVIDER VISIT (OUTPATIENT)
Dept: INTERNAL MEDICINE | Facility: IMAGING CENTER | Age: 50
End: 2025-04-14
Payer: COMMERCIAL

## 2025-04-14 VITALS
TEMPERATURE: 98.2 F | RESPIRATION RATE: 14 BRPM | SYSTOLIC BLOOD PRESSURE: 122 MMHG | DIASTOLIC BLOOD PRESSURE: 70 MMHG | HEART RATE: 66 BPM | OXYGEN SATURATION: 98 %

## 2025-04-14 DIAGNOSIS — R09.81 SINUS CONGESTION: Primary | ICD-10-CM

## 2025-04-14 DIAGNOSIS — J06.9 UPPER RESPIRATORY TRACT INFECTION, UNSPECIFIED TYPE: ICD-10-CM

## 2025-04-14 DIAGNOSIS — Z79.899 MEDICATION MANAGEMENT: ICD-10-CM

## 2025-04-14 LAB
BASOPHILS # BLD AUTO: 0.6 % (ref 0–1.8)
BASOPHILS # BLD: 0.04 K/UL (ref 0–0.12)
EOSINOPHIL # BLD AUTO: 0.15 K/UL (ref 0–0.51)
EOSINOPHIL NFR BLD: 2.4 % (ref 0–6.9)
ERYTHROCYTE [DISTWIDTH] IN BLOOD BY AUTOMATED COUNT: 40 FL (ref 35.9–50)
FLUAV RNA SPEC QL NAA+PROBE: NEGATIVE
FLUBV RNA SPEC QL NAA+PROBE: NEGATIVE
HCT VFR BLD AUTO: 42 % (ref 42–52)
HGB BLD-MCNC: 14.4 G/DL (ref 14–18)
IMM GRANULOCYTES # BLD AUTO: 0.01 K/UL (ref 0–0.11)
IMM GRANULOCYTES NFR BLD AUTO: 0.2 % (ref 0–0.9)
LYMPHOCYTES # BLD AUTO: 2.06 K/UL (ref 1–4.8)
LYMPHOCYTES NFR BLD: 33.3 % (ref 22–41)
MCH RBC QN AUTO: 29.8 PG (ref 27–33)
MCHC RBC AUTO-ENTMCNC: 34.3 G/DL (ref 32.3–36.5)
MCV RBC AUTO: 86.8 FL (ref 81.4–97.8)
MONOCYTES # BLD AUTO: 0.56 K/UL (ref 0–0.85)
MONOCYTES NFR BLD AUTO: 9 % (ref 0–13.4)
NEUTROPHILS # BLD AUTO: 3.37 K/UL (ref 1.82–7.42)
NEUTROPHILS NFR BLD: 54.5 % (ref 44–72)
NRBC # BLD AUTO: 0 K/UL
NRBC BLD-RTO: 0 /100 WBC (ref 0–0.2)
PLATELET # BLD AUTO: 144 K/UL (ref 164–446)
PMV BLD AUTO: 10.9 FL (ref 9–12.9)
RBC # BLD AUTO: 4.84 M/UL (ref 4.7–6.1)
RSV RNA SPEC QL NAA+PROBE: NEGATIVE
SARS-COV-2 RNA RESP QL NAA+PROBE: NEGATIVE
WBC # BLD AUTO: 6.2 K/UL (ref 4.8–10.8)

## 2025-04-14 PROCEDURE — 85025 COMPLETE CBC W/AUTO DIFF WBC: CPT

## 2025-04-14 PROCEDURE — 82607 VITAMIN B-12: CPT

## 2025-04-14 PROCEDURE — 80053 COMPREHEN METABOLIC PANEL: CPT

## 2025-04-14 PROCEDURE — 3074F SYST BP LT 130 MM HG: CPT | Performed by: FAMILY MEDICINE

## 2025-04-14 PROCEDURE — 83036 HEMOGLOBIN GLYCOSYLATED A1C: CPT

## 2025-04-14 PROCEDURE — 0241U POCT CEPHEID COV-2, FLU A/B, RSV - PCR: CPT

## 2025-04-14 PROCEDURE — 3078F DIAST BP <80 MM HG: CPT | Performed by: FAMILY MEDICINE

## 2025-04-14 PROCEDURE — 82306 VITAMIN D 25 HYDROXY: CPT

## 2025-04-14 PROCEDURE — 84443 ASSAY THYROID STIM HORMONE: CPT

## 2025-04-14 PROCEDURE — 99213 OFFICE O/P EST LOW 20 MIN: CPT | Performed by: FAMILY MEDICINE

## 2025-04-14 RX ORDER — TESTOSTERONE 20.25 MG/1.25G
20.25 GEL TOPICAL DAILY
COMMUNITY
Start: 2025-01-06

## 2025-04-15 LAB
25(OH)D3 SERPL-MCNC: 41 NG/ML (ref 30–100)
ALBUMIN SERPL BCP-MCNC: 4.3 G/DL (ref 3.2–4.9)
ALBUMIN/GLOB SERPL: 1.4 G/DL
ALP SERPL-CCNC: 70 U/L (ref 30–99)
ALT SERPL-CCNC: 34 U/L (ref 2–50)
ANION GAP SERPL CALC-SCNC: 11 MMOL/L (ref 7–16)
AST SERPL-CCNC: 24 U/L (ref 12–45)
BILIRUB SERPL-MCNC: 0.3 MG/DL (ref 0.1–1.5)
BUN SERPL-MCNC: 19 MG/DL (ref 8–22)
CALCIUM ALBUM COR SERPL-MCNC: 9 MG/DL (ref 8.5–10.5)
CALCIUM SERPL-MCNC: 9.2 MG/DL (ref 8.5–10.5)
CHLORIDE SERPL-SCNC: 106 MMOL/L (ref 96–112)
CO2 SERPL-SCNC: 25 MMOL/L (ref 20–33)
CREAT SERPL-MCNC: 1.09 MG/DL (ref 0.5–1.4)
EST. AVERAGE GLUCOSE BLD GHB EST-MCNC: 117 MG/DL
GFR SERPLBLD CREATININE-BSD FMLA CKD-EPI: 83 ML/MIN/1.73 M 2
GLOBULIN SER CALC-MCNC: 3 G/DL (ref 1.9–3.5)
GLUCOSE SERPL-MCNC: 82 MG/DL (ref 65–99)
HBA1C MFR BLD: 5.7 % (ref 4–5.6)
POTASSIUM SERPL-SCNC: 3.9 MMOL/L (ref 3.6–5.5)
PROT SERPL-MCNC: 7.3 G/DL (ref 6–8.2)
SODIUM SERPL-SCNC: 142 MMOL/L (ref 135–145)
TSH SERPL-ACNC: 2.12 UIU/ML (ref 0.38–5.33)
VIT B12 SERPL-MCNC: 518 PG/ML (ref 211–911)

## 2025-04-18 ENCOUNTER — RESULTS FOLLOW-UP (OUTPATIENT)
Dept: INTERNAL MEDICINE | Facility: IMAGING CENTER | Age: 50
End: 2025-04-18

## 2025-04-18 NOTE — PROGRESS NOTES
Chief Complaint   Patient presents with    URI       Subjective:     HPI:   Jose Kahn is a 49 y.o. male , patient of Dr. Rodarte ,here  to discuss the evaluation and management of:    He reports she has had a cold for a few weeks some nasal congestion  Could be allergies  He has had no fever, chills but described throat discomfort    He would also like to have his labs drawn    No problems updated.     Objective:     /70   Pulse 66   Temp 36.8 °C (98.2 °F)   Resp 14   SpO2 98%  There is no height or weight on file to calculate BMI.    Physical Exam:  Physical Exam  Constitutional:       General: He is not in acute distress.     Appearance: Normal appearance. He is normal weight. He is not ill-appearing, toxic-appearing or diaphoretic.   HENT:      Head: Normocephalic and atraumatic.      Ears:      Comments:       Nose: Nose normal.   Eyes:      Conjunctiva/sclera: Conjunctivae normal.   Cardiovascular:      Rate and Rhythm: Normal rate.   Pulmonary:      Effort: Pulmonary effort is normal.   Musculoskeletal:      Cervical back: Neck supple.   Neurological:      General: No focal deficit present.      Mental Status: He is alert.   Psychiatric:         Mood and Affect: Mood normal.         Behavior: Behavior normal.         Thought Content: Thought content normal.         Judgment: Judgment normal.       Constitutional: Well-developed and well-nourished. Not diaphoretic. No distress.   Skin: Skin is warm and dry. No rash noted.  Head: Atraumatic without lesions.  Eyes: Conjunctivae and extraocular motions are normal.   Ears:  External ears unremarkable.  Tms neg  Nose: Nares patent. Mucosa without edema or erythema. No discharge. No facial tenderness.     Neck: Supple, No thyromegaly present. No JVD  Cardiovascular: Regular rate and rhythm.   Chest: Effort normal. Clear to auscultation throughout. No adventitious sounds.   Abdomen:  without distention.  .  Extremities: No cyanosis, clubbing, erythema,  nor edema.   Neurological: Alert and oriented x 3.    Psychiatric:  Behavior, mood, and affect are appropriate     Assessment and Plan:     The following treatment plan was discussed:     No problem-specific Assessment & Plan notes found for this encounter.    1. Sinus congestion  -Symptomatic care           2. Medication management  CBC WITH DIFFERENTIAL    TSH    Comp Metabolic Panel    VITAMIN D 25-HYDROXY    VITAMIN B12    HEMOGLOBIN A1C               Any change or worsening of signs or symptoms, patient encouraged to follow-up or report to emergency room for further evaluation. Patient verbalizes understanding and agrees.    Follow-Up: No follow-ups on file.      PLEASE NOTE: This dictation was created using voice recognition software. I have made every reasonable attempt to correct obvious errors, but I expect that there are errors of grammar and possibly content that I did not discover before finalizing the note.    My total time spent caring for the patient on the day of the encounter was  greater than 20 minutes.   This includes obtaining history, reviewing chart, physical exam, patient education, reviewing outside records, placing orders, interpreting tests and coordinating care.

## 2025-07-17 ENCOUNTER — HOSPITAL ENCOUNTER (OUTPATIENT)
Facility: MEDICAL CENTER | Age: 50
End: 2025-07-17
Attending: INTERNAL MEDICINE
Payer: COMMERCIAL

## 2025-07-17 ENCOUNTER — OFFICE VISIT (OUTPATIENT)
Dept: INTERNAL MEDICINE | Facility: IMAGING CENTER | Age: 50
End: 2025-07-17
Payer: COMMERCIAL

## 2025-07-17 VITALS
WEIGHT: 189 LBS | BODY MASS INDEX: 27.06 KG/M2 | OXYGEN SATURATION: 96 % | RESPIRATION RATE: 14 BRPM | DIASTOLIC BLOOD PRESSURE: 76 MMHG | TEMPERATURE: 98 F | SYSTOLIC BLOOD PRESSURE: 114 MMHG | HEIGHT: 70 IN | HEART RATE: 77 BPM

## 2025-07-17 DIAGNOSIS — F41.1 GENERALIZED ANXIETY DISORDER WITH PANIC ATTACKS: ICD-10-CM

## 2025-07-17 DIAGNOSIS — K62.5 BRBPR (BRIGHT RED BLOOD PER RECTUM): ICD-10-CM

## 2025-07-17 DIAGNOSIS — Z00.00 WELL ADULT EXAM: ICD-10-CM

## 2025-07-17 DIAGNOSIS — E74.39 GLUCOSE INTOLERANCE: ICD-10-CM

## 2025-07-17 DIAGNOSIS — F41.0 GENERALIZED ANXIETY DISORDER WITH PANIC ATTACKS: ICD-10-CM

## 2025-07-17 DIAGNOSIS — K64.9 HEMORRHOIDS, UNSPECIFIED HEMORRHOID TYPE: Primary | ICD-10-CM

## 2025-07-17 LAB
25(OH)D3 SERPL-MCNC: 37 NG/ML (ref 30–100)
ALBUMIN SERPL BCP-MCNC: 4.5 G/DL (ref 3.2–4.9)
ALBUMIN/GLOB SERPL: 1.7 G/DL
ALP SERPL-CCNC: 70 U/L (ref 30–99)
ALT SERPL-CCNC: 49 U/L (ref 2–50)
ANION GAP SERPL CALC-SCNC: 13 MMOL/L (ref 7–16)
AST SERPL-CCNC: 29 U/L (ref 12–45)
BASOPHILS # BLD AUTO: 0.3 % (ref 0–1.8)
BASOPHILS # BLD: 0.02 K/UL (ref 0–0.12)
BILIRUB SERPL-MCNC: 0.5 MG/DL (ref 0.1–1.5)
BUN SERPL-MCNC: 22 MG/DL (ref 8–22)
CALCIUM ALBUM COR SERPL-MCNC: 8.7 MG/DL (ref 8.5–10.5)
CALCIUM SERPL-MCNC: 9.1 MG/DL (ref 8.5–10.5)
CHLORIDE SERPL-SCNC: 108 MMOL/L (ref 96–112)
CHOLEST SERPL-MCNC: 155 MG/DL (ref 100–199)
CO2 SERPL-SCNC: 23 MMOL/L (ref 20–33)
CORTIS SERPL-MCNC: 9.2 UG/DL (ref 0–23)
CREAT SERPL-MCNC: 1.14 MG/DL (ref 0.5–1.4)
EOSINOPHIL # BLD AUTO: 0.1 K/UL (ref 0–0.51)
EOSINOPHIL NFR BLD: 1.5 % (ref 0–6.9)
ERYTHROCYTE [DISTWIDTH] IN BLOOD BY AUTOMATED COUNT: 39.8 FL (ref 35.9–50)
GFR SERPLBLD CREATININE-BSD FMLA CKD-EPI: 79 ML/MIN/1.73 M 2
GLOBULIN SER CALC-MCNC: 2.7 G/DL (ref 1.9–3.5)
GLUCOSE SERPL-MCNC: 94 MG/DL (ref 65–99)
HCT VFR BLD AUTO: 43 % (ref 42–52)
HCYS SERPL-SCNC: 18.8 UMOL/L
HDLC SERPL-MCNC: 35 MG/DL
HGB BLD-MCNC: 14.5 G/DL (ref 14–18)
IMM GRANULOCYTES # BLD AUTO: 0.01 K/UL (ref 0–0.11)
IMM GRANULOCYTES NFR BLD AUTO: 0.2 % (ref 0–0.9)
LDLC SERPL CALC-MCNC: 97 MG/DL
LYMPHOCYTES # BLD AUTO: 2.01 K/UL (ref 1–4.8)
LYMPHOCYTES NFR BLD: 30.2 % (ref 22–41)
MCH RBC QN AUTO: 29.1 PG (ref 27–33)
MCHC RBC AUTO-ENTMCNC: 33.7 G/DL (ref 32.3–36.5)
MCV RBC AUTO: 86.3 FL (ref 81.4–97.8)
MONOCYTES # BLD AUTO: 0.48 K/UL (ref 0–0.85)
MONOCYTES NFR BLD AUTO: 7.2 % (ref 0–13.4)
NEUTROPHILS # BLD AUTO: 4.04 K/UL (ref 1.82–7.42)
NEUTROPHILS NFR BLD: 60.6 % (ref 44–72)
NRBC # BLD AUTO: 0 K/UL
NRBC BLD-RTO: 0 /100 WBC (ref 0–0.2)
PLATELET # BLD AUTO: 187 K/UL (ref 164–446)
PMV BLD AUTO: 10.7 FL (ref 9–12.9)
POTASSIUM SERPL-SCNC: 4.1 MMOL/L (ref 3.6–5.5)
PROT SERPL-MCNC: 7.2 G/DL (ref 6–8.2)
PSA SERPL DL<=0.01 NG/ML-MCNC: 1.43 NG/ML (ref 0–4)
RBC # BLD AUTO: 4.98 M/UL (ref 4.7–6.1)
SODIUM SERPL-SCNC: 144 MMOL/L (ref 135–145)
TRIGL SERPL-MCNC: 114 MG/DL (ref 0–149)
TSH SERPL-ACNC: 1.67 UIU/ML (ref 0.38–5.33)
VIT B12 SERPL-MCNC: 408 PG/ML (ref 211–911)
WBC # BLD AUTO: 6.7 K/UL (ref 4.8–10.8)

## 2025-07-17 PROCEDURE — 3078F DIAST BP <80 MM HG: CPT | Performed by: INTERNAL MEDICINE

## 2025-07-17 PROCEDURE — 80053 COMPREHEN METABOLIC PANEL: CPT

## 2025-07-17 PROCEDURE — 85025 COMPLETE CBC W/AUTO DIFF WBC: CPT

## 2025-07-17 PROCEDURE — 99214 OFFICE O/P EST MOD 30 MIN: CPT | Performed by: INTERNAL MEDICINE

## 2025-07-17 PROCEDURE — 84443 ASSAY THYROID STIM HORMONE: CPT

## 2025-07-17 PROCEDURE — 84403 ASSAY OF TOTAL TESTOSTERONE: CPT

## 2025-07-17 PROCEDURE — 84153 ASSAY OF PSA TOTAL: CPT

## 2025-07-17 PROCEDURE — 82172 ASSAY OF APOLIPOPROTEIN: CPT

## 2025-07-17 PROCEDURE — 84402 ASSAY OF FREE TESTOSTERONE: CPT

## 2025-07-17 PROCEDURE — 82306 VITAMIN D 25 HYDROXY: CPT

## 2025-07-17 PROCEDURE — 83036 HEMOGLOBIN GLYCOSYLATED A1C: CPT

## 2025-07-17 PROCEDURE — 82607 VITAMIN B-12: CPT

## 2025-07-17 PROCEDURE — 3074F SYST BP LT 130 MM HG: CPT | Performed by: INTERNAL MEDICINE

## 2025-07-17 PROCEDURE — 84270 ASSAY OF SEX HORMONE GLOBUL: CPT

## 2025-07-17 PROCEDURE — 83090 ASSAY OF HOMOCYSTEINE: CPT

## 2025-07-17 PROCEDURE — 80061 LIPID PANEL: CPT

## 2025-07-17 PROCEDURE — 82533 TOTAL CORTISOL: CPT

## 2025-07-17 PROCEDURE — 83695 ASSAY OF LIPOPROTEIN(A): CPT

## 2025-07-17 RX ORDER — BUSPIRONE HYDROCHLORIDE 10 MG/1
10 TABLET ORAL DAILY
Qty: 90 TABLET | Refills: 3 | Status: SHIPPED | OUTPATIENT
Start: 2025-07-17

## 2025-07-17 ASSESSMENT — FIBROSIS 4 INDEX: FIB4 SCORE: 1.37

## 2025-07-18 LAB
EST. AVERAGE GLUCOSE BLD GHB EST-MCNC: 103 MG/DL
HBA1C MFR BLD: 5.2 % (ref 4–5.6)

## 2025-07-20 LAB
APO B100 SERPL-MCNC: 94 MG/DL (ref 66–133)
LPA SERPL-MCNC: 25 MG/DL
SHBG SERPL-SCNC: 20 NMOL/L (ref 17–56)
TESTOST FREE MFR SERPL: 2.3 % (ref 1.6–2.9)
TESTOST FREE SERPL-MCNC: 75 PG/ML (ref 47–244)
TESTOST SERPL-MCNC: 327 NG/DL (ref 300–890)

## 2025-07-31 PROBLEM — I48.91 ATRIAL FIBRILLATION (HCC): Status: RESOLVED | Noted: 2018-03-19 | Resolved: 2025-07-31

## 2025-07-31 NOTE — PROGRESS NOTES
"Chief Complaint   Patient presents with    Follow-Up       HISTORY OF THE PRESENT ILLNESS: Patient is a 50 y.o. male.     Patient comes in for follow-up on his chronic issues.  He has generally been feeling well.  He has history of anxiety associated with flying also mild generalized anxiety.  He has been on SSRI therapy in the past but discontinued.  He now uses alprazolam as needed.  He also takes BuSpar.    He has a history of dyslipidemia.  He has a history of both elevated LDL and low HDL.  He had a cardiac calcium CT in February 2021 with a score of 0.    He also has a history of borderline low testosterone.    Currently, he complains of episodes of red blood per rectum.  He has a history of hemorrhoids.    Allergies: Food, Finasteride, and Monosodium glutamate    Current Medications and Prescriptions Ordered in Epic[1]    Past medical history, social history and family history were reviewed from chart today    Review of systems: Per HPI.    All others negative.     Exam: /76 (BP Location: Left arm, Patient Position: Sitting, BP Cuff Size: Adult)   Pulse 77   Temp 36.7 °C (98 °F) (Temporal)   Resp 14   Ht 1.778 m (5' 10\")   Wt 85.7 kg (189 lb)   SpO2 96%   General: Well-appearing. Well-developed. No signs of distress.  HEENT: Grossly normal. Oral cavity is pink and moist.   Neck: Supple without JVD or bruit.  Pulmonary: Clear with good breath sounds. Normal effort.  Cardiovascular: Regular. Carotid and radial pulses are intact.  Abdomen: Soft, nontender, nondistended. Spleen and liver are not enlarged.  Neurologic: Cranial nerves II through XII are grossly normal, alert and oriented x3      Diagnosis:  1. Hemorrhoids, unspecified hemorrhoid type        2. BRBPR (bright red blood per rectum)  Referral to Gastroenterology      3. Well adult exam  CBC WITH DIFFERENTIAL    Comp Metabolic Panel    Testosterone, Free & Total, Adult Male (w/SHBG)    HEMOGLOBIN A1C    VITAMIN D,25 HYDROXY (DEFICIENCY)    " "Lipid Profile    TSH    CORTISOL    HOMOCYSTEINE    VITAMIN B12    APOLIPOPROTEIN B    Lipoprotein (a)    URINALYSIS,CULTURE IF INDICATED    PROSTATE SPECIFIC AG SCREENING        Patient is in good health.  Labs as above.  Refer to GI.    My total time spent caring for the patient on the day of the encounter was  greater than 30+ minutes.   This includes obtaining history, reviewing chart, physical exam, patient education, reviewing outside records, placing orders, interpreting tests and coordinating care.    Portions of this note were completed using voice recognition software (Dragon Naturally speaking software) . Occasional transcription errors may have escaped proof reading. I have made every reasonable attempt to correct obvious errors, but I expect that there are errors of grammar and possibly content that I did not discover before finalizing the note.        [1]   Current Outpatient Medications Ordered in Epic   Medication Sig Dispense Refill    busPIRone (BUSPAR) 10 MG Tab tablet Take 1 Tablet by mouth every day. 90 Tablet 3    cholestyramine (QUESTRAN) 4 g packet Take 1 Packet by mouth every day. MUST PRESCRIBE \"PAR [employer]ARMACEUT[employer]\" BRAND      ALPRAZolam (XANAX) 0.5 MG Tab Take 0.5 mg by mouth as needed (flying anxiety).      Cholecalciferol (VITAMIN D3) 50 MCG (2000 UT) Tab Take 2,000 Units by mouth every day.      ketoconazole (NIZORAL) 2 % shampoo Shampoo to wet hair every 72 hours, and as needed. 120 mL 3    Testosterone 1.62 % Gel Apply 20.25 mg topically every day. (Patient not taking: Reported on 7/17/2025)      BIOTIN MAXIMUM PO Take  by mouth every day.       No current [facility]-ordered facility-administered medications on file.     "

## 2025-08-19 ENCOUNTER — OFFICE VISIT (OUTPATIENT)
Dept: INTERNAL MEDICINE | Facility: IMAGING CENTER | Age: 50
End: 2025-08-19
Payer: COMMERCIAL

## 2025-08-19 VITALS
WEIGHT: 184 LBS | HEART RATE: 75 BPM | SYSTOLIC BLOOD PRESSURE: 112 MMHG | RESPIRATION RATE: 14 BRPM | DIASTOLIC BLOOD PRESSURE: 78 MMHG | BODY MASS INDEX: 26.4 KG/M2 | TEMPERATURE: 98 F | OXYGEN SATURATION: 98 %

## 2025-08-19 DIAGNOSIS — R79.89 LOW TESTOSTERONE: ICD-10-CM

## 2025-08-19 DIAGNOSIS — K62.5 BRBPR (BRIGHT RED BLOOD PER RECTUM): ICD-10-CM

## 2025-08-19 DIAGNOSIS — E72.11 HYPERHOMOCYSTEINEMIA (HCC): ICD-10-CM

## 2025-08-19 DIAGNOSIS — K64.9 HEMORRHOIDS, UNSPECIFIED HEMORRHOID TYPE: ICD-10-CM

## 2025-08-19 DIAGNOSIS — Z00.00 WELL ADULT EXAM: Primary | ICD-10-CM

## 2025-08-19 PROCEDURE — 3078F DIAST BP <80 MM HG: CPT | Performed by: INTERNAL MEDICINE

## 2025-08-19 PROCEDURE — 3074F SYST BP LT 130 MM HG: CPT | Performed by: INTERNAL MEDICINE

## 2025-08-19 PROCEDURE — 99396 PREV VISIT EST AGE 40-64: CPT | Performed by: INTERNAL MEDICINE

## 2025-08-19 RX ORDER — TESTOSTERONE 20.25 MG/1.25G
40.5 GEL TOPICAL DAILY
Qty: 75 G | Refills: 2 | Status: SHIPPED | OUTPATIENT
Start: 2025-08-19 | End: 2025-11-17

## 2025-08-19 RX ORDER — MINOXIDIL 2.5 MG/1
2.5 TABLET ORAL DAILY
COMMUNITY

## 2025-08-19 ASSESSMENT — PATIENT HEALTH QUESTIONNAIRE - PHQ9: CLINICAL INTERPRETATION OF PHQ2 SCORE: 0

## 2025-08-19 ASSESSMENT — FIBROSIS 4 INDEX: FIB4 SCORE: 1.09
